# Patient Record
Sex: FEMALE | Race: WHITE | Employment: FULL TIME | URBAN - METROPOLITAN AREA
[De-identification: names, ages, dates, MRNs, and addresses within clinical notes are randomized per-mention and may not be internally consistent; named-entity substitution may affect disease eponyms.]

---

## 2019-12-23 PROBLEM — Z97.5 NEXPLANON IN PLACE: Status: ACTIVE | Noted: 2019-01-31

## 2020-10-23 ENCOUNTER — HOSPITAL ENCOUNTER (OUTPATIENT)
Age: 25
Discharge: HOME | End: 2020-10-23

## 2020-10-23 LAB — RUBV IGG SER QL: 175.7 IU/ML

## 2020-10-23 PROCEDURE — 86735 MUMPS ANTIBODY: CPT

## 2020-10-23 PROCEDURE — 86787 VARICELLA-ZOSTER ANTIBODY: CPT

## 2020-10-23 PROCEDURE — 86481 TB AG RESPONSE T-CELL SUSP: CPT

## 2020-10-23 PROCEDURE — 86762 RUBELLA ANTIBODY: CPT

## 2020-10-23 PROCEDURE — 86765 RUBEOLA ANTIBODY: CPT

## 2020-10-26 LAB
MEASLES ANTIBODY IGG: 3.79
MUV IGG SER QL: 5.42
T-SPOT TB TEST: NORMAL
VZV IGG SER QL IA: 2.16

## 2021-05-18 ENCOUNTER — HOSPITAL ENCOUNTER (OUTPATIENT)
Age: 26
Setting detail: SPECIMEN
Discharge: HOME | End: 2021-05-18
Payer: COMMERCIAL

## 2021-05-18 DIAGNOSIS — N92.6 IRREGULAR PERIODS: ICD-10-CM

## 2021-05-18 LAB
ABSOLUTE EOS #: 0.11 K/UL (ref 0–0.44)
ABSOLUTE IMMATURE GRANULOCYTE: <0.03 K/UL (ref 0–0.3)
ABSOLUTE LYMPH #: 2.34 K/UL (ref 1.1–3.7)
ABSOLUTE MONO #: 0.33 K/UL (ref 0.1–1.2)
BASOPHILS # BLD: 0 % (ref 0–2)
BASOPHILS ABSOLUTE: <0.03 K/UL (ref 0–0.2)
DIFFERENTIAL TYPE: NORMAL
EOSINOPHILS RELATIVE PERCENT: 2 % (ref 1–4)
HCG QUALITATIVE: NEGATIVE
HCT VFR BLD CALC: 43.8 % (ref 36.3–47.1)
HEMOGLOBIN: 13.5 G/DL (ref 11.9–15.1)
IMMATURE GRANULOCYTES: 0 %
LYMPHOCYTES # BLD: 37 % (ref 24–43)
MCH RBC QN AUTO: 27.8 PG (ref 25.2–33.5)
MCHC RBC AUTO-ENTMCNC: 30.8 G/DL (ref 28.4–34.8)
MCV RBC AUTO: 90.1 FL (ref 82.6–102.9)
MONOCYTES # BLD: 5 % (ref 3–12)
NRBC AUTOMATED: 0 PER 100 WBC
PDW BLD-RTO: 12.9 % (ref 11.8–14.4)
PLATELET # BLD: 150 K/UL (ref 138–453)
PLATELET ESTIMATE: NORMAL
PMV BLD AUTO: 11.8 FL (ref 8.1–13.5)
RBC # BLD: 4.86 M/UL (ref 3.95–5.11)
RBC # BLD: NORMAL 10*6/UL
SEG NEUTROPHILS: 56 % (ref 36–65)
SEGMENTED NEUTROPHILS ABSOLUTE COUNT: 3.46 K/UL (ref 1.5–8.1)
THYROXINE, FREE: 1.08 NG/DL (ref 0.93–1.7)
TSH SERPL DL<=0.05 MIU/L-ACNC: 3.56 MIU/L (ref 0.3–5)
WBC # BLD: 6.3 K/UL (ref 3.5–11.3)
WBC # BLD: NORMAL 10*3/UL

## 2021-05-25 ENCOUNTER — OFFICE VISIT (OUTPATIENT)
Dept: OBGYN CLINIC | Age: 26
End: 2021-05-25
Payer: COMMERCIAL

## 2021-05-25 ENCOUNTER — HOSPITAL ENCOUNTER (OUTPATIENT)
Age: 26
Setting detail: SPECIMEN
Discharge: HOME | End: 2021-05-25
Payer: COMMERCIAL

## 2021-05-25 VITALS
SYSTOLIC BLOOD PRESSURE: 118 MMHG | WEIGHT: 210 LBS | DIASTOLIC BLOOD PRESSURE: 72 MMHG | HEIGHT: 67 IN | BODY MASS INDEX: 32.96 KG/M2

## 2021-05-25 DIAGNOSIS — Z01.419 ENCOUNTER FOR GYNECOLOGICAL EXAMINATION WITHOUT ABNORMAL FINDING: Primary | ICD-10-CM

## 2021-05-25 DIAGNOSIS — Z97.5 BREAKTHROUGH BLEEDING ON NEXPLANON: ICD-10-CM

## 2021-05-25 DIAGNOSIS — N92.1 BREAKTHROUGH BLEEDING ON NEXPLANON: ICD-10-CM

## 2021-05-25 DIAGNOSIS — Z30.09 ENCOUNTER FOR OTHER GENERAL COUNSELING OR ADVICE ON CONTRACEPTION: ICD-10-CM

## 2021-05-25 PROCEDURE — G8427 DOCREV CUR MEDS BY ELIG CLIN: HCPCS | Performed by: OBSTETRICS & GYNECOLOGY

## 2021-05-25 PROCEDURE — G8417 CALC BMI ABV UP PARAM F/U: HCPCS | Performed by: OBSTETRICS & GYNECOLOGY

## 2021-05-25 PROCEDURE — 1036F TOBACCO NON-USER: CPT | Performed by: OBSTETRICS & GYNECOLOGY

## 2021-05-25 PROCEDURE — 99385 PREV VISIT NEW AGE 18-39: CPT | Performed by: OBSTETRICS & GYNECOLOGY

## 2021-05-25 NOTE — PATIENT INSTRUCTIONS
Please schedule an appointment at your convenience to have your Nexplanon removed. We will contact you with the results of today's Pap smear.   Breakthrough bleeding on

## 2021-05-25 NOTE — PROGRESS NOTES
DATE OF VISIT:  21        History and Physical    Sharad Rodrigues    :  1995  CHIEF COMPLAINT:    Chief Complaint   Patient presents with    Established New Doctor     new patient  irregular periods had 18 day in /thru  then  21 thru 21    had nexplanon inserted 16                    HPI :   Sharad Rodrigues is a 27 y.o. femaleGRAVIDA 1 PARA 1001    Sharad Rodrigues comes in today as a new visit referral from Dr. Day Patient primarily for contraceptive counseling. She had a Nexplanon placed 2018. She states that over the past month she has had some irregular bleeding episodes with mild to moderate cramping. She is also overdue for her annual exam.  She is having regular bowel movements without constipation or diarrhea. She denies any UTI symptoms or involuntary loss of urine. She is otherwise without any significant complaints today. Aracelis Paul is employed at [employer]. She has completed her Covid vaccinations.     _____________________________________________________________________  Past Medical History:   Diagnosis Date    COVID-19 2020                                                                   Past Surgical History:   Procedure Laterality Date    WISDOM TOOTH EXTRACTION       Family History   Problem Relation Age of Onset    Heart Disease Mother         congenital    Heart Disease Paternal Grandfather      Social History     Tobacco Use   Smoking Status Never Smoker   Smokeless Tobacco Never Used     Social History     Substance and Sexual Activity   Alcohol Use Yes     Current Outpatient Medications   Medication Sig Dispense Refill    Multiple Vitamins-Minerals (THERAPEUTIC MULTIVITAMIN-MINERALS) tablet Take 1 tablet by mouth daily      Cholecalciferol (VITAMIN D) 50 MCG (2000 UT) CAPS capsule Take by mouth every other day      etonogestrel (NEXPLANON) 68 MG implant Inject into the skin       No current facility-administered medications for this visit. Allergies:  No Known Allergies    Gynecologic History:  No LMP recorded. Sexually Active: Yes  STD History: Yes/trichomoniasis  Abnormal Pap History no  Birth Control: Yes, Nexplanon    OB History    Para Term  AB Living   1 1 1 0 0 1   SAB TAB Ectopic Molar Multiple Live Births   0 0 0 0 0 0     ______________________________________________________________________  REVIEW OF SYSTEMS:        Constitutional:  Unexpected weight change no  Neurological:  Frequent headaches  no  Ophthalmic:  Recent visual changes no  ENT:   Difficulty swallowing  no  Breast:              Masses   no     Respiratory:  Shortness of breath  no    Cardiovascular: Chest pain   no     Gastrointestinal: Chronic diarrhea/constipation no   Urogenital:  Urinary incontinence  no                                         Heavy/irregular periods           yes                                      Vaginal discharge                   no  Hematological: Bruises easy   no     Endocrine:  Nipple Discharge  no     Hot/Cold Intolerance  no   Psychological:  Mood and affect were wnl yes                                                                                                                                           Physical Exam:    Vitals:    21 1357   BP: 118/72   Site: Right Upper Arm   Position: Sitting   Cuff Size: Medium Adult   Weight: 210 lb (95.3 kg)   Height: 5' 7\" (1.702 m)       General Appearance:  She does not appear to be in any distress. This  is a well developed, well nourished, well groomed female. Neurological:  The patient is alert and oriented to time, place, person, and situation without any noted sensory motor deficits. Skin:  A brief inspection of the skin revealed no rashes or lesions. Neck:  The neck was supple. There is no tracheal deviation, thyromegaly or supraclavicular adenopathy appreciated. Respiratory: There was unlabored respiratory effort. Lungs clear to ascultation without wheezes, rales or rhonchi in all fields bilaterally. Cardiovascular:  Normal sinus rhythm with a regular rate and without murmur, rubs or gallops. Abdomen: The abdomen was soft and non-tender with no guarding, rebound, CVAT or rigidity. No hernias were appreciated. Bowel sounds were normally active. Pelvic exam:  No vulvar, vaginal or cervical lesions are noted. Normal vaginal discharge present, no significant cystocele, rectocele or enterocele noted. Uterus nongravid and without CMT and adnexa nontender and without abnormal masses bilaterally. Extremities:  FROM and nontender without clubbing cyanosis or edema. ASSESSMENT:         ICD-10-CM    1. Encounter for gynecological examination without abnormal finding  Z01.419    2. Encounter for other general counseling or advice on contraception  Z30.09    3. Breakthrough bleeding on Nexplanon  N92.1     Z97.5                    PLAN:  At this point in time she does not desire to continue with contraception and will schedule an appointment to have her Nexplanon removed. Return to the office in 1 year or when necessary    Mason Martinez M.D., [facility]  [place]uli[place]

## 2021-05-27 LAB — CYTOLOGY REPORT: NORMAL

## 2021-06-03 ENCOUNTER — PROCEDURE VISIT (OUTPATIENT)
Dept: OBGYN CLINIC | Age: 26
End: 2021-06-03
Payer: COMMERCIAL

## 2021-06-03 DIAGNOSIS — Z97.5 BREAKTHROUGH BLEEDING ON NEXPLANON: ICD-10-CM

## 2021-06-03 DIAGNOSIS — Z30.46 ENCOUNTER FOR NEXPLANON REMOVAL: Primary | ICD-10-CM

## 2021-06-03 DIAGNOSIS — N92.1 BREAKTHROUGH BLEEDING ON NEXPLANON: ICD-10-CM

## 2021-06-03 PROCEDURE — 11982 REMOVE DRUG IMPLANT DEVICE: CPT | Performed by: OBSTETRICS & GYNECOLOGY

## 2021-06-03 NOTE — PROGRESS NOTES
Marilia Dears to the office today to have her Nexplanon removed. She is doing well, desires to proceed and voices no significant complaints today. Patient was placed in the supine and the distal end of the Nexplanon was identified and marked. The area was then cleaned with Betadine. 1% lidocaine was then used to infiltrate the subcutaneous tissue. The Nexplanon was removed uneventfully and intact. It was then shown to the patient and the wound was Steri-Stripped and bandaged. Patient tolerated procedure well and desires nothing for contraception. She was counseled on post insertion wound care and warning signs for infection. ICD-10-CM    1. Encounter for Nexplanon removal  Z30.46    2. Breakthrough bleeding on Nexplanon  N92.1 VT REMOVAL DRUG IMPLANT DEVICE    Z97.5      She left in good condition and will return in 1 year for her annual or when necessary.

## 2021-06-03 NOTE — PATIENT INSTRUCTIONS
Please follow the wound care instructions given to you. Please call the office if you have any questions or concerns after your Nexplanon removal.  Plan on returning to the office for your annual exam or as needed. Have a safe summer and a healthy year!